# Patient Record
Sex: FEMALE | Race: WHITE
[De-identification: names, ages, dates, MRNs, and addresses within clinical notes are randomized per-mention and may not be internally consistent; named-entity substitution may affect disease eponyms.]

---

## 2021-01-22 ENCOUNTER — HOSPITAL ENCOUNTER (OUTPATIENT)
Dept: HOSPITAL 19 - MC.RAD | Age: 66
End: 2021-01-22
Attending: NURSE PRACTITIONER
Payer: MEDICARE

## 2021-01-22 DIAGNOSIS — N63.10: ICD-10-CM

## 2021-01-22 DIAGNOSIS — N63.20: ICD-10-CM

## 2021-01-22 DIAGNOSIS — Z12.31: Primary | ICD-10-CM

## 2021-01-28 ENCOUNTER — HOSPITAL ENCOUNTER (OUTPATIENT)
Dept: HOSPITAL 19 - MC.RAD | Age: 66
End: 2021-01-28
Attending: NURSE PRACTITIONER
Payer: MEDICARE

## 2021-01-28 DIAGNOSIS — N63.22: ICD-10-CM

## 2021-01-28 DIAGNOSIS — N60.01: Primary | ICD-10-CM

## 2021-02-23 ENCOUNTER — HOSPITAL ENCOUNTER (OUTPATIENT)
Dept: HOSPITAL 19 - SDCO | Age: 66
Discharge: HOME | End: 2021-02-23
Attending: SURGERY
Payer: MEDICARE

## 2021-02-23 VITALS — DIASTOLIC BLOOD PRESSURE: 57 MMHG | SYSTOLIC BLOOD PRESSURE: 109 MMHG | HEART RATE: 86 BPM

## 2021-02-23 VITALS — SYSTOLIC BLOOD PRESSURE: 107 MMHG | HEART RATE: 89 BPM | DIASTOLIC BLOOD PRESSURE: 50 MMHG

## 2021-02-23 VITALS — BODY MASS INDEX: 45.14 KG/M2 | HEIGHT: 66 IN | WEIGHT: 280.87 LBS

## 2021-02-23 VITALS — DIASTOLIC BLOOD PRESSURE: 62 MMHG | HEART RATE: 20 BPM | SYSTOLIC BLOOD PRESSURE: 134 MMHG | TEMPERATURE: 98.3 F

## 2021-02-23 VITALS — HEART RATE: 85 BPM | DIASTOLIC BLOOD PRESSURE: 52 MMHG | SYSTOLIC BLOOD PRESSURE: 109 MMHG | TEMPERATURE: 98.3 F

## 2021-02-23 DIAGNOSIS — L97.509: ICD-10-CM

## 2021-02-23 DIAGNOSIS — I10: ICD-10-CM

## 2021-02-23 DIAGNOSIS — Z79.84: ICD-10-CM

## 2021-02-23 DIAGNOSIS — C50.412: Primary | ICD-10-CM

## 2021-02-23 DIAGNOSIS — E11.621: ICD-10-CM

## 2021-02-23 DIAGNOSIS — Z20.822: ICD-10-CM

## 2021-02-23 DIAGNOSIS — Z79.899: ICD-10-CM

## 2021-02-23 DIAGNOSIS — M19.90: ICD-10-CM

## 2021-02-23 DIAGNOSIS — M54.9: ICD-10-CM

## 2021-02-23 DIAGNOSIS — G89.29: ICD-10-CM

## 2021-02-23 DIAGNOSIS — J45.909: ICD-10-CM

## 2021-02-23 DIAGNOSIS — Z89.421: ICD-10-CM

## 2021-02-23 DIAGNOSIS — F32.9: ICD-10-CM

## 2021-02-23 DIAGNOSIS — E66.01: ICD-10-CM

## 2021-02-23 DIAGNOSIS — Z88.0: ICD-10-CM

## 2021-02-23 DIAGNOSIS — Z80.8: ICD-10-CM

## 2021-02-23 DIAGNOSIS — Z79.890: ICD-10-CM

## 2021-02-23 DIAGNOSIS — E78.5: ICD-10-CM

## 2021-02-23 DIAGNOSIS — E11.42: ICD-10-CM

## 2021-02-23 DIAGNOSIS — Z17.0: ICD-10-CM

## 2021-02-23 DIAGNOSIS — E03.9: ICD-10-CM

## 2021-02-23 PROCEDURE — A9541 TC99M SULFUR COLLOID: HCPCS

## 2021-02-23 NOTE — NUR
TO RM 6 PER CART FROM PACU. ALERT ORIENTED X3, TALKING WITH STAFF.
INCISION CLEAN AND DRY WITH SKIN ADHESIVE.
DENIES PAIN OR DISCOMFORT.
DENIES NAUSEA

## 2021-02-23 NOTE — NUR
DISCHARGED PER WC BY NURSING STAFF TO PRIVATE CAR IN CARE OF FRIEND
ALL PERSONAL BELONGINGS SENT WITH PATIENT.

## 2021-02-23 NOTE — NUR
Patient returns to room 6 per cart from radiology and is resting on the cart.
Siderails up x2 and call light in reach.

## 2021-02-23 NOTE — NUR
VOIDED AND AMBULATED BACK TO  WITH CANE. HAD TO REMIND PATIENT TO SLOW HER
GAIT DOWN TO MAKE IT MORE STEADY.
RECEIVED DISCHARGE INSTRUCTIONS AND VERBALIZED UNDERSTANDING.
DISCONTINUED IV AND INT.
CALLED FRIEND FOR RIDE HOME.
ASSISTED PATIENT DRESSED.

## 2021-06-21 ENCOUNTER — HOSPITAL ENCOUNTER (OUTPATIENT)
Dept: HOSPITAL 19 - MC.RAD | Age: 66
End: 2021-06-21
Attending: CLINIC/CENTER
Payer: MEDICARE

## 2021-06-21 DIAGNOSIS — R92.2: ICD-10-CM

## 2021-06-21 DIAGNOSIS — N60.01: Primary | ICD-10-CM

## 2022-02-08 ENCOUNTER — HOSPITAL ENCOUNTER (OUTPATIENT)
Dept: HOSPITAL 19 - MC.RAD | Age: 67
End: 2022-02-08
Payer: MEDICARE

## 2022-02-08 DIAGNOSIS — Z92.3: ICD-10-CM

## 2022-02-08 DIAGNOSIS — Z85.3: Primary | ICD-10-CM

## 2022-02-08 DIAGNOSIS — Z98.890: ICD-10-CM

## 2022-06-09 ENCOUNTER — HOSPITAL ENCOUNTER (OUTPATIENT)
Dept: HOSPITAL 19 - MC.RAD | Age: 67
End: 2022-06-09
Attending: SURGERY
Payer: MEDICARE

## 2022-06-09 DIAGNOSIS — N64.89: Primary | ICD-10-CM

## 2022-06-09 DIAGNOSIS — Z98.890: ICD-10-CM

## 2022-07-29 ENCOUNTER — HOSPITAL ENCOUNTER (OUTPATIENT)
Dept: HOSPITAL 19 - MKS.ESL.PT | Age: 67
LOS: 2 days | Discharge: HOME | End: 2022-07-31
Attending: NURSE PRACTITIONER
Payer: MEDICARE

## 2022-07-29 DIAGNOSIS — R29.6: Primary | ICD-10-CM

## 2022-08-29 ENCOUNTER — HOSPITAL ENCOUNTER (OUTPATIENT)
Dept: HOSPITAL 19 - MKS.ESL.PT | Age: 67
LOS: 2 days | Discharge: HOME | End: 2022-08-31
Attending: NURSE PRACTITIONER
Payer: MEDICARE

## 2022-08-29 DIAGNOSIS — R29.6: Primary | ICD-10-CM
